# Patient Record
Sex: MALE | Race: BLACK OR AFRICAN AMERICAN | Employment: UNEMPLOYED | URBAN - METROPOLITAN AREA
[De-identification: names, ages, dates, MRNs, and addresses within clinical notes are randomized per-mention and may not be internally consistent; named-entity substitution may affect disease eponyms.]

---

## 2017-07-04 PROBLEM — R10.31 RLQ ABDOMINAL PAIN: Status: ACTIVE | Noted: 2017-07-04

## 2017-07-05 PROBLEM — K35.32 ACUTE PERFORATED APPENDICITIS: Status: ACTIVE | Noted: 2017-07-05

## 2019-01-25 ENCOUNTER — HOSPITAL ENCOUNTER (EMERGENCY)
Age: 39
Discharge: HOME | End: 2019-01-25
Attending: EMERGENCY MEDICINE
Payer: MEDICARE

## 2019-01-25 VITALS
OXYGEN SATURATION: 97 % | SYSTOLIC BLOOD PRESSURE: 119 MMHG | RESPIRATION RATE: 14 BRPM | HEART RATE: 75 BPM | DIASTOLIC BLOOD PRESSURE: 82 MMHG | WEIGHT: 141.2 LBS | BODY MASS INDEX: 19.13 KG/M2 | HEIGHT: 72 IN | TEMPERATURE: 98.6 F

## 2019-01-25 DIAGNOSIS — F11.93 OPIATE WITHDRAWAL (HCC): Primary | ICD-10-CM

## 2019-01-25 PROCEDURE — 6370000000 HC RX 637 (ALT 250 FOR IP): Performed by: EMERGENCY MEDICINE

## 2019-01-25 PROCEDURE — 99283 EMERGENCY DEPT VISIT LOW MDM: CPT

## 2019-01-25 PROCEDURE — 6360000002 HC RX W HCPCS: Performed by: EMERGENCY MEDICINE

## 2019-01-25 RX ORDER — DICYCLOMINE HYDROCHLORIDE 10 MG/1
20 CAPSULE ORAL ONCE
Status: COMPLETED | OUTPATIENT
Start: 2019-01-25 | End: 2019-01-25

## 2019-01-25 RX ORDER — DICYCLOMINE HCL 20 MG
20 TABLET ORAL EVERY 6 HOURS PRN
Qty: 20 TABLET | Refills: 0 | Status: SHIPPED | OUTPATIENT
Start: 2019-01-25 | End: 2019-01-30

## 2019-01-25 RX ORDER — PROMETHAZINE HYDROCHLORIDE 25 MG/1
25 TABLET ORAL 4 TIMES DAILY PRN
Qty: 20 TABLET | Refills: 0 | Status: SHIPPED | OUTPATIENT
Start: 2019-01-25 | End: 2019-01-30

## 2019-01-25 RX ORDER — PROMETHAZINE HYDROCHLORIDE 25 MG/1
25 TABLET ORAL ONCE
Status: COMPLETED | OUTPATIENT
Start: 2019-01-25 | End: 2019-01-25

## 2019-01-25 RX ORDER — IBUPROFEN 600 MG/1
600 TABLET ORAL 4 TIMES DAILY PRN
Qty: 20 TABLET | Refills: 0 | Status: SHIPPED | OUTPATIENT
Start: 2019-01-25 | End: 2021-04-09

## 2019-01-25 RX ADMIN — IBUPROFEN 600 MG: 400 TABLET ORAL at 08:25

## 2019-01-25 RX ADMIN — PROMETHAZINE HYDROCHLORIDE 25 MG: 25 TABLET ORAL at 08:25

## 2019-01-25 RX ADMIN — DICYCLOMINE HYDROCHLORIDE 20 MG: 10 CAPSULE ORAL at 08:25

## 2019-01-25 ASSESSMENT — PAIN DESCRIPTION - PAIN TYPE
TYPE: ACUTE PAIN
TYPE: ACUTE PAIN

## 2019-01-25 ASSESSMENT — PAIN DESCRIPTION - DESCRIPTORS
DESCRIPTORS: ACHING
DESCRIPTORS: ACHING

## 2019-01-25 ASSESSMENT — PAIN SCALES - GENERAL
PAINLEVEL_OUTOF10: 4
PAINLEVEL_OUTOF10: 9

## 2019-01-25 ASSESSMENT — PAIN DESCRIPTION - LOCATION
LOCATION: GENERALIZED
LOCATION: GENERALIZED

## 2021-04-09 ENCOUNTER — APPOINTMENT (OUTPATIENT)
Dept: GENERAL RADIOLOGY | Age: 41
End: 2021-04-09
Payer: MEDICARE

## 2021-04-09 ENCOUNTER — HOSPITAL ENCOUNTER (EMERGENCY)
Age: 41
Discharge: HOME | End: 2021-04-09
Payer: MEDICARE

## 2021-04-09 VITALS
SYSTOLIC BLOOD PRESSURE: 149 MMHG | TEMPERATURE: 99 F | RESPIRATION RATE: 19 BRPM | BODY MASS INDEX: 22.35 KG/M2 | WEIGHT: 165 LBS | DIASTOLIC BLOOD PRESSURE: 90 MMHG | HEART RATE: 100 BPM | HEIGHT: 72 IN | OXYGEN SATURATION: 98 %

## 2021-04-09 DIAGNOSIS — S62.92XA CLOSED FRACTURE OF LEFT HAND, INITIAL ENCOUNTER: Primary | ICD-10-CM

## 2021-04-09 PROCEDURE — 99283 EMERGENCY DEPT VISIT LOW MDM: CPT

## 2021-04-09 PROCEDURE — 6370000000 HC RX 637 (ALT 250 FOR IP): Performed by: PHYSICIAN ASSISTANT

## 2021-04-09 PROCEDURE — 99281 EMR DPT VST MAYX REQ PHY/QHP: CPT

## 2021-04-09 PROCEDURE — 73130 X-RAY EXAM OF HAND: CPT

## 2021-04-09 RX ORDER — IBUPROFEN 800 MG/1
800 TABLET ORAL EVERY 6 HOURS PRN
Qty: 30 TABLET | Refills: 0 | Status: SHIPPED | OUTPATIENT
Start: 2021-04-09

## 2021-04-09 RX ORDER — HYDROCODONE BITARTRATE AND ACETAMINOPHEN 5; 325 MG/1; MG/1
1 TABLET ORAL EVERY 6 HOURS PRN
Qty: 20 TABLET | Refills: 0 | Status: SHIPPED | OUTPATIENT
Start: 2021-04-09 | End: 2021-04-14

## 2021-04-09 RX ORDER — HYDROCODONE BITARTRATE AND ACETAMINOPHEN 5; 325 MG/1; MG/1
1 TABLET ORAL ONCE
Status: COMPLETED | OUTPATIENT
Start: 2021-04-09 | End: 2021-04-09

## 2021-04-09 RX ADMIN — HYDROCODONE BITARTRATE AND ACETAMINOPHEN 1 TABLET: 5; 325 TABLET ORAL at 22:11

## 2021-04-09 ASSESSMENT — PAIN SCALES - GENERAL
PAINLEVEL_OUTOF10: 10
PAINLEVEL_OUTOF10: 10

## 2021-04-09 ASSESSMENT — PAIN DESCRIPTION - PAIN TYPE: TYPE: ACUTE PAIN
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
## 2021-04-15 ENCOUNTER — OFFICE VISIT (OUTPATIENT)
Dept: ORTHOPEDIC SURGERY | Age: 41
End: 2021-04-15
Payer: MEDICARE

## 2021-04-15 VITALS
RESPIRATION RATE: 16 BRPM | OXYGEN SATURATION: 98 % | HEART RATE: 100 BPM | HEIGHT: 72 IN | WEIGHT: 165 LBS | BODY MASS INDEX: 22.35 KG/M2

## 2021-04-15 DIAGNOSIS — S62.335A CLOSED DISPLACED FRACTURE OF NECK OF FOURTH METACARPAL BONE OF LEFT HAND, INITIAL ENCOUNTER: Primary | ICD-10-CM

## 2021-04-15 DIAGNOSIS — S62.337A CLOSED DISPLACED FRACTURE OF NECK OF FIFTH METACARPAL BONE OF LEFT HAND, INITIAL ENCOUNTER: ICD-10-CM

## 2021-04-15 PROCEDURE — G8427 DOCREV CUR MEDS BY ELIG CLIN: HCPCS | Performed by: ORTHOPAEDIC SURGERY

## 2021-04-15 PROCEDURE — 26605 TREAT METACARPAL FRACTURE: CPT | Performed by: ORTHOPAEDIC SURGERY

## 2021-04-15 PROCEDURE — 4004F PT TOBACCO SCREEN RCVD TLK: CPT | Performed by: ORTHOPAEDIC SURGERY

## 2021-04-15 PROCEDURE — 99213 OFFICE O/P EST LOW 20 MIN: CPT | Performed by: ORTHOPAEDIC SURGERY

## 2021-04-15 PROCEDURE — G8420 CALC BMI NORM PARAMETERS: HCPCS | Performed by: ORTHOPAEDIC SURGERY

## 2021-04-15 NOTE — PATIENT INSTRUCTIONS
Cast application  Adhere to cast care guidelines  Continue to take Norco and Ibuprofen as needed  Rest and elevate as needed  Follow up in 2-3 weeks with x-rays (if cast remains in good condition, x-rays can be completed in cast, if not, cast removal then x-rays

## 2021-04-16 PROBLEM — S62.337A CLOSED DISPLACED FRACTURE OF NECK OF LEFT FIFTH METACARPAL BONE: Status: ACTIVE | Noted: 2021-04-16

## 2021-04-16 PROBLEM — S62.335A CLOSED DISPLACED FRACTURE OF NECK OF LEFT FOURTH METACARPAL BONE: Status: ACTIVE | Noted: 2021-04-16

## 2021-04-16 ASSESSMENT — ENCOUNTER SYMPTOMS
VOMITING: 0
WHEEZING: 0
SHORTNESS OF BREATH: 0
COLOR CHANGE: 0
CHEST TIGHTNESS: 0
EYE REDNESS: 0
EYE PAIN: 0
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
## 2021-05-04 ENCOUNTER — OFFICE VISIT (OUTPATIENT)
Dept: ORTHOPEDIC SURGERY | Age: 41
End: 2021-05-04

## 2021-05-04 VITALS
HEART RATE: 99 BPM | OXYGEN SATURATION: 96 % | BODY MASS INDEX: 22.35 KG/M2 | HEIGHT: 72 IN | WEIGHT: 165 LBS | RESPIRATION RATE: 15 BRPM

## 2021-05-04 DIAGNOSIS — S62.337D CLOSED DISPLACED FRACTURE OF NECK OF FIFTH METACARPAL BONE OF LEFT HAND WITH ROUTINE HEALING, SUBSEQUENT ENCOUNTER: ICD-10-CM

## 2021-05-04 DIAGNOSIS — Z09 FOLLOW-UP EXAM: ICD-10-CM

## 2021-05-04 DIAGNOSIS — S62.335D CLOSED DISPLACED FRACTURE OF NECK OF FOURTH METACARPAL BONE OF LEFT HAND WITH ROUTINE HEALING, SUBSEQUENT ENCOUNTER: Primary | ICD-10-CM

## 2021-05-04 PROCEDURE — 99024 POSTOP FOLLOW-UP VISIT: CPT | Performed by: ORTHOPAEDIC SURGERY

## 2021-05-04 NOTE — PROGRESS NOTES
5/4/2021   Chief Complaint   Patient presents with    Fracture     left hand 4th and 5th metatarsal DOI 4/9/21        History of Present Illness:                             Rafia Wheeler is a 41 y.o. male returns today for follow-up of his left hand fractures. He has done well in his cast and has no pain today. His cast is fitting well and in good condition. Patient returns to the office today for left hand 4th and 5th metatarsal DOI 4/9/21. Pt states pain today is a 0/10 he states he has only taken one ibuprofen since his last visit. Pt states he has been working in his cast a feels he has done well with the cast on. Medical History  Patient's medications, allergies, past medical, surgical, social and family histories were reviewed and updated as appropriate. Review of Systems:   Review of Systems                                            Examination:  General Exam:  Vitals: Pulse 99   Resp 15   Ht 6' (1.829 m)   Wt 165 lb (74.8 kg)   SpO2 96%   BMI 22.38 kg/m²    Physical Exam   Left upper extremity:  Cast in place with ulnar gutter cast.  Sensation and motor function is intact at the fingertips. Cast is in good position and fitting well. Diagnostic testing:  X-rays reviewed in office, I independently reviewed the films in the office today:   Xr Hand Left (min 3 Views)    Result Date: 5/4/2021  3 views of the hand show excellent position and improved alignment of the fourth and fifth metacarpal neck fractures with anatomic alignment on the AP view and only mild residual apex dorsal angulation seen on the lateral view much improved from previous x-rays. Cast material in place. Normal alignment of the MCP joints of the fourth and fifth fingers. Office Procedures:  No orders of the defined types were placed in this encounter. Assessment and Plan  1.   Left and fourth and fifth metacarpal neck fractures status post closed reduction and casting    I reviewed the x-rays with the patient explained that there is excellent alignment with good improvement of his deformities following the closed reduction and casting. I have recommended that we continue with his cast immobilization for another 2 weeks. I would like him to follow-up here in 2 weeks for cast off and repeat x-rays. We will likely transition him to a brace with early range of motion at that time.         Electronically signed by Sanjana Berrios MD on 5/4/2021 at 11:29 AM

## 2021-05-04 NOTE — PATIENT INSTRUCTIONS
Continue to adhere to cast care guidelines  May continue to take Norco as needed or Ibuprofen  Rest and elevate as needed  Follow up in 2 weeks for cast removal

## 2021-05-04 NOTE — PROGRESS NOTES
Patient returns to the office today for left hand 4th and 5th metatarsal DOI 4/9/21. Pt states pain today is a 0/10 he states he has only taken one ibuprofen since his last visit. Pt states he has been working in his cast a feels he has done well with the cast on.

## 2021-05-20 ENCOUNTER — OFFICE VISIT (OUTPATIENT)
Dept: ORTHOPEDIC SURGERY | Age: 41
End: 2021-05-20

## 2021-05-20 VITALS
HEART RATE: 100 BPM | BODY MASS INDEX: 22.35 KG/M2 | WEIGHT: 165 LBS | OXYGEN SATURATION: 99 % | HEIGHT: 72 IN | RESPIRATION RATE: 16 BRPM

## 2021-05-20 DIAGNOSIS — S62.335D CLOSED DISPLACED FRACTURE OF NECK OF FOURTH METACARPAL BONE OF LEFT HAND WITH ROUTINE HEALING, SUBSEQUENT ENCOUNTER: Primary | ICD-10-CM

## 2021-05-20 DIAGNOSIS — S62.337D CLOSED DISPLACED FRACTURE OF NECK OF FIFTH METACARPAL BONE OF LEFT HAND WITH ROUTINE HEALING, SUBSEQUENT ENCOUNTER: ICD-10-CM

## 2021-05-20 PROCEDURE — 99024 POSTOP FOLLOW-UP VISIT: CPT | Performed by: ORTHOPAEDIC SURGERY

## 2021-05-21 NOTE — PROGRESS NOTES
Procedures:  No orders of the defined types were placed in this encounter. Assessment and Plan  1. Left fourth and fifth impacted metacarpal neck fractures, healing    We reviewed the x-rays and have explained that his fractures are well aligned and showing signs of healing. I recommended we proceed with early range of motion activities. I have shown him a duties as a home program.  I also gave him a removable brace to wear as needed for protection. He will continue to avoid strenuous lifting pushing pulling and gripping.     Follow-up in 2 weeks for repeat x-rays and check of his progress with his range of motion activities        Electronically signed by Amelia Owens MD on 5/21/2021 at 9:34 AM

## 2021-06-13 ENCOUNTER — APPOINTMENT (OUTPATIENT)
Dept: GENERAL RADIOLOGY | Age: 41
End: 2021-06-13
Payer: MEDICARE

## 2021-06-13 ENCOUNTER — HOSPITAL ENCOUNTER (EMERGENCY)
Age: 41
Discharge: ANOTHER HOSPITAL | End: 2021-06-14
Attending: EMERGENCY MEDICINE
Payer: MEDICARE
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
## 2021-06-13 DIAGNOSIS — J94.2 HEMOTHORAX ON LEFT: ICD-10-CM

## 2021-06-13 PROCEDURE — 99291 CRITICAL CARE FIRST HOUR: CPT

## 2021-06-13 PROCEDURE — 32551 INSERTION OF CHEST TUBE: CPT

## 2021-06-14 VITALS
SYSTOLIC BLOOD PRESSURE: 139 MMHG | OXYGEN SATURATION: 93 % | HEART RATE: 152 BPM | RESPIRATION RATE: 36 BRPM | WEIGHT: 180 LBS | DIASTOLIC BLOOD PRESSURE: 113 MMHG

## 2021-06-14 LAB
ABO/RH: NORMAL
ANTIBODY SCREEN: NORMAL
COMPONENT: NORMAL
COMPONENT: NORMAL
CROSSMATCH RESULT: NORMAL
CROSSMATCH RESULT: NORMAL
STATUS: NORMAL
STATUS: NORMAL
TRANSFUSION STATUS: NORMAL
TRANSFUSION STATUS: NORMAL
UNIT DIVISION: 0
UNIT DIVISION: 0
UNIT NUMBER: NORMAL
UNIT NUMBER: NORMAL
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
## 2021-06-14 NOTE — ED NOTES
Called [facility] transfer center again at [id number] and spoke with Royce Spaulding. Dr Charlie Sloan then spoke with Dr Anna Mueller at the [facility] ED. Care flight is leaving the building at this time (0008).      Jovany Mcmullen  06/14/21 0006 Patient notified. No further question at this time.  Anna Mackay MA 8/10/2018
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
## 2021-06-14 NOTE — ED NOTES
Bed: 01TR-01  Expected date:   Expected time:   Means of arrival:   Comments:  Possible MICHAEL Mayers RN  06/13/21 8664

## 2021-06-14 NOTE — ED NOTES
Called [facility] [place] center and spoke with Sergio Rice.      Geovani Ley  06/13/21 7571